# Patient Record
(demographics unavailable — no encounter records)

---

## 2025-02-26 NOTE — HISTORY OF PRESENT ILLNESS
[Headaches] : no headaches [Abdominal Pain] : no abdominal pain [FreeTextEntry2] : George is a 5 year 7 month old male who returns for follow up of growth. He was initially referred in 8/2024 (age 5y1m). Mother concerned that George was the shortest in his class.  His sister (Pat) is 6.4 yo and has been following with Dr. Stevens x 1 year and was diagnosed with GH deficiency. At my visit, George was at the 19th percentile for height, 21st percentile for weight and 42nd percentile for BMI. Labs from 8/22/24 showed slightly abnormal CBC; normal: CMP, TFTs, CRP, ESR, celiac screen, IGF-BP3, IGF-1. Repeat CBC on 9/20/24 was normal.   George now returns for follow up. No recent illnesses.   plan 5.63 cm/year  gained 2 lbs  fu in 6-8 months